# Patient Record
Sex: FEMALE | URBAN - METROPOLITAN AREA
[De-identification: names, ages, dates, MRNs, and addresses within clinical notes are randomized per-mention and may not be internally consistent; named-entity substitution may affect disease eponyms.]

---

## 2024-04-09 ENCOUNTER — APPOINTMENT (RX ONLY)
Dept: URBAN - METROPOLITAN AREA OTHER 6 | Facility: OTHER | Age: 39
Setting detail: DERMATOLOGY
End: 2024-04-09

## 2024-04-09 DIAGNOSIS — L65.0 TELOGEN EFFLUVIUM: ICD-10-CM

## 2024-04-09 PROCEDURE — 99203 OFFICE O/P NEW LOW 30 MIN: CPT

## 2024-04-09 PROCEDURE — ? PRESCRIPTION MEDICATION MANAGEMENT

## 2024-04-09 PROCEDURE — ? PRESCRIPTION

## 2024-04-09 PROCEDURE — ? ADDITIONAL NOTES

## 2024-04-09 PROCEDURE — ? RECORDS REQUESTED

## 2024-04-09 PROCEDURE — ? COUNSELING

## 2024-04-09 RX ORDER — FLUOCINOLONE ACETONIDE 0.11 MG/ML
OIL TOPICAL
Qty: 118.28 | Refills: 4 | Status: ERX | COMMUNITY
Start: 2024-04-09

## 2024-04-09 RX ORDER — MINOXIDIL 2.5 MG/1
TABLET ORAL QD
Qty: 30 | Refills: 0 | Status: ERX | COMMUNITY
Start: 2024-04-09

## 2024-04-09 RX ADMIN — FLUOCINOLONE ACETONIDE: 0.11 OIL TOPICAL at 00:00

## 2024-04-09 RX ADMIN — MINOXIDIL: 2.5 TABLET ORAL at 00:00

## 2024-04-09 ASSESSMENT — LOCATION SIMPLE DESCRIPTION DERM: LOCATION SIMPLE: RIGHT SCALP

## 2024-04-09 ASSESSMENT — LOCATION ZONE DERM: LOCATION ZONE: SCALP

## 2024-04-09 ASSESSMENT — LOCATION DETAILED DESCRIPTION DERM: LOCATION DETAILED: RIGHT MEDIAL FRONTAL SCALP

## 2024-04-09 NOTE — PROCEDURE: RECORDS REQUESTED
Detail Level: Simple
Preface: I requested the records from the following providers.
Providers To Request Records From: Dr. Lazaro

## 2024-04-09 NOTE — HPI: HAIR LOSS
Previous Labs: Yes
How Did The Hair Loss Occur?: gradual in onset
How Severe Is Your Hair Loss?: moderate
Additional History: Currently applying castor oil.
When Were The Labs Drawn? (Drawn...): Last visit at her pcp
Lab Details: Everything was normal but was given Vitamin D to take once weekly

## 2024-04-09 NOTE — PROCEDURE: PRESCRIPTION MEDICATION MANAGEMENT
Initiate Treatment: Rx Derma-Smoothe/FS Scalp Oil 0.01 %. Apply to areas of scalp twice a day(recommend using a spray bottle to apply to scalp)\\n\\nRx minoxidil 2.5 mg tablet. Take 1/2 tablet by mouth daily.
Plan: Will request labs from pcp to see what she was tested for \\n\\nWill recheck in 2 months and determine if more labs need to be drawn
Render In Strict Bullet Format?: No
Detail Level: Zone

## 2024-04-09 NOTE — PROCEDURE: ADDITIONAL NOTES
Render Risk Assessment In Note?: no
Detail Level: Simple
Additional Notes: Pt notes diffuse shedding that occured last year even after she dyed her hair red. Denies illness, new medication or hospitalization. She does take anabolic steroids but has been taking these for years before the hair loss started. She may have had low vitamin D levels. I am requesting blood work that her PCP performed in response to the alopecia. Based on results, I will likely do more blood work. I would look at CBC w/ diff, iron, ferritin, MAGALIS, vitamin D, zinc, free and total testosterone, DHEAS, thyroid profile. Pt insistent on getting some sort of treatment today. I gave Derma-smoothe oil and PO minoxidil 1.25 mg daily